# Patient Record
Sex: FEMALE | Race: WHITE | NOT HISPANIC OR LATINO | Employment: FULL TIME | URBAN - METROPOLITAN AREA
[De-identification: names, ages, dates, MRNs, and addresses within clinical notes are randomized per-mention and may not be internally consistent; named-entity substitution may affect disease eponyms.]

---

## 2024-01-19 NOTE — PROGRESS NOTES
"Subjective   Patient ID: Zohra Hines is a 78 y.o. female who presents for Medicare Annual Wellness Visit Subsequent. I have reviewed her past social, surgical, family and medical history.       HPI   The patient states that she is taking Sertraline daily as well as Alprazolam as needed for anxiety and depression and mirtazapine for her insomnia. Her past surgeries include, eye surgery, ankle repair surgery and a rip hip repair. She has a family history of breast cancer, CAD and emphysema. She complains of stiffness in her left ankle but denies having any pain. Her BP is well- controlled without any medications at this time.    Review of Systems  Constitutional: No fever or chills, No Night Sweats  Eyes: No Blurry Vision or Eye sight problems  ENT: No Nasal Discharge, Hoarseness, sore throat  Cardiovascular: no chest pain, no palpitations and no syncope.   Respiratory: no cough, no shortness of breath during exertion and no shortness of breath at rest.   Gastrointestinal: no abdominal pain, no nausea and no vomiting.   : No vaginal discharge, burning with urination, no blood in urine or stools  Skin: No Skin rashes or Lesions  Neuro: No Headache, no dizziness or Numbness or tingling  Psych: No Anxiety, depression or sleeping problems  Heme: No Easy bleeding or brusing.     Objective   /68   Pulse 65   Ht 1.549 m (5' 1\")   Wt 58.5 kg (129 lb)   SpO2 99%   BMI 24.37 kg/m²     Physical Exam  Patient declined Chaperone  Constitutional: Alert and in no acute distress. Well developed, well nourished.   Head and Face: Head and face: Normal.    Eyes: Normal external exam. Pupils were equal in size, round, reactive to light (PERRL) with normal accommodation and extraocular movements intact (EOMI).   Ears, Nose, Mouth, and Throat: External inspection of ears and nose: Normal.  Hearing: Normal.  Nasal mucosa, septum, and turbinates: Normal.  Lips, teeth, and gums: Normal.  Oropharynx: Normal.   Neck: No neck " mass was observed. Supple. Thyroid not enlarged and there were no palpable thyroid nodules.   Cardiovascular: Heart rate and rhythm were normal, normal S1 and S2. Pedal pulses: Normal. No peripheral edema.   Pulmonary: No respiratory distress. Clear bilateral breath sounds.   Breast: Normal Appearance, No Masses or lumps palpated  Abdomen: Soft nontender; no abdominal mass palpated. Normal bowel sounds. No organomegaly.   Musculoskeletal: No joint swelling seen, normal movements of all extremities. Range of motion: Normal.  Muscle strength/tone: Normal.    Skin: Normal skin color and pigmentation, normal skin turgor, and no rash.   Neurologic: Deep tendon reflexes were 2+ and symmetric.   Psychiatric: Judgment and insight: Intact. Mood and affect: Normal.  Lymphatic: No cervical lymphadenopathy. Palpation of lymph nodes in axillae: Normal.  Palpation of lymph nodes in groin: Normal.    Lab Results   Component Value Date    WBC 5.8 07/11/2018    HGB 11.9 (L) 07/11/2018    HCT 35.5 (L) 07/11/2018     (L) 07/11/2018    ALT 16 07/08/2018    AST 17 07/08/2018     07/11/2018    K 4.2 07/11/2018     07/11/2018    CREATININE 0.92 07/11/2018    BUN 29 (H) 07/11/2018    CO2 24 07/11/2018    INR 1.0 07/08/2018       Electrocardiogram 12 Lead  Normal sinus rhythm  ST & T wave abnormality, consider anterior ischemia  Abnormal ECG  No previous ECGs available  Confirmed by MATA BANEGAS JR, MD ([id number]) on 2/13/2014 3:17:23 PM  Electrocardiogram 12 Lead  Normal sinus rhythm  Nonspecific ST and T wave abnormality  Abnormal ECG  When compared with ECG of 02-FEB-2014 22:39,  T wave inversion no longer evident in Anterior leads  Confirmed by MIK CAMEJO MD ([id number]) on 7/9/2018 6:06:48 PM      Assessment/Plan   Diagnoses and all orders for this visit:  Medicare annual wellness visit, subsequent  Need for vaccination  -     Pneumococcal conjugate vaccine 20-valent IM  Asymptomatic menopausal state  -     XR  DEXA bone density; Future  Encounter for screening mammogram for breast cancer  -     BI mammo bilateral screening tomosynthesis; Future  Need for hepatitis C screening test  -     Hepatitis C antibody; Future  Screening for colorectal cancer  -     Cologuard® colon cancer screening; Future  Other vitamin B12 deficiency anemia  -     Vitamin B12; Future  Essential hypertension  -     CBC; Future  -     Comprehensive Metabolic Panel; Future  Elevated low density lipoprotein (LDL) cholesterol level  -     Lipid Panel; Future  -     Lipoprotein a; Future  Vitamin D deficiency  -     Vitamin D 25-Hydroxy,Total (for eval of Vitamin D levels); Future  Elevated blood sugar  -     Hemoglobin A1C; Future  Abnormal thyroid exam  -     TSH with reflex to Free T4 if abnormal; Future  Chronic pain of left ankle  -     Referral to Physical Therapy; Future        Dear Zohra Hines     It was my pleasure to take care of you today in the office. Below are the things we discussed today:    1. 1. Immunizations: Yearly Flu shot is recommended. Up-to-date          a: COVID: Please get booster from the pharmacy          b: Tetanus: Please get from the pharmacy          c: Shingrix: Up-to-date         d: Pneumovax: Up-to-date         e: Prevnar: Given today     2. Blood Work: Ordered   3. Seen your dentist twice a year  4. Yearly Eye exam is recommended    5. BMI: Normal  6: Diet recommendations:   Eat Clean, Try to have as many home cooked meals as possible  Avoid processed foods which contain excess calories, sugar, and sodium.    7. Exercise recommendations:   150 minutes a week to maintain your weight     If you have to loose weight, you need a better diet and exercise plan.     8. Supplements recommended:  a - Calcium 600 mg up to twice a day to get a total of 1200 mg. Each 8 oz of milk or yogurt or 1 oz of cheese, 1 Banana, 1 serving of green Leafy vegetable has about 300 mg of Calcium, so you may subtract that amount. Calcium  citrate is the only acceptable supplement to take if you take an acid suppressing medication like Prilosec; otherwise Calcium carbonate is acceptable too (It can cause Constipation).   b - Vitamin D - 2000 IU daily     9. Please get your Living will / Advance directive completed if you do not have one already. Please make sure our office has a copy of the latest one.     10. Colonoscopy: Cologuard ordered   11. Mammogram: Ordered   12. PAP: Not indicated   13. DEXA: Ordered   14: Skin Check: Please see Dermatology once a year for a Skin Check.     15. Insomnia: Continue mirtazapine.     16. Depression and anxiety: Continue sertraline and use Xanax as needed.    17. Left ankle stiffness: Physical therapy referral. Recommended that she use an ankle brace when walking.    18. Hyperlipidemia: Lipoprotein A ordered.    Follow up in one year for a Physical. Please call the office before your Physical to see if you need blood work completed prior to your physical.     Please call me if any questions arise from now until your next visit. I will call you after I am done seeing patients. A Doctor is always available by phone when the office is closed. Please feel free to call for help with any problem that you feel shouldn't wait until the office re-opens.     Scribe Attestation  By signing my name below, ISapna Scribe   attest that this documentation has been prepared under the direction and in the presence of Tiarra Frye MD.

## 2024-01-20 ENCOUNTER — OFFICE VISIT (OUTPATIENT)
Dept: PRIMARY CARE | Facility: CLINIC | Age: 79
End: 2024-01-20
Payer: MEDICARE

## 2024-01-20 VITALS
BODY MASS INDEX: 24.35 KG/M2 | DIASTOLIC BLOOD PRESSURE: 68 MMHG | SYSTOLIC BLOOD PRESSURE: 112 MMHG | OXYGEN SATURATION: 99 % | HEIGHT: 61 IN | HEART RATE: 65 BPM | WEIGHT: 129 LBS

## 2024-01-20 DIAGNOSIS — R94.6 ABNORMAL THYROID EXAM: ICD-10-CM

## 2024-01-20 DIAGNOSIS — Z78.0 ASYMPTOMATIC MENOPAUSAL STATE: ICD-10-CM

## 2024-01-20 DIAGNOSIS — Z11.59 NEED FOR HEPATITIS C SCREENING TEST: ICD-10-CM

## 2024-01-20 DIAGNOSIS — R73.9 ELEVATED BLOOD SUGAR: ICD-10-CM

## 2024-01-20 DIAGNOSIS — Z12.12 SCREENING FOR COLORECTAL CANCER: ICD-10-CM

## 2024-01-20 DIAGNOSIS — I10 ESSENTIAL HYPERTENSION: ICD-10-CM

## 2024-01-20 DIAGNOSIS — G89.29 CHRONIC PAIN OF LEFT ANKLE: ICD-10-CM

## 2024-01-20 DIAGNOSIS — D51.8 OTHER VITAMIN B12 DEFICIENCY ANEMIA: ICD-10-CM

## 2024-01-20 DIAGNOSIS — E78.00 ELEVATED LOW DENSITY LIPOPROTEIN (LDL) CHOLESTEROL LEVEL: ICD-10-CM

## 2024-01-20 DIAGNOSIS — M25.572 CHRONIC PAIN OF LEFT ANKLE: ICD-10-CM

## 2024-01-20 DIAGNOSIS — Z12.31 ENCOUNTER FOR SCREENING MAMMOGRAM FOR BREAST CANCER: ICD-10-CM

## 2024-01-20 DIAGNOSIS — Z23 NEED FOR VACCINATION: ICD-10-CM

## 2024-01-20 DIAGNOSIS — Z00.00 MEDICARE ANNUAL WELLNESS VISIT, SUBSEQUENT: Primary | ICD-10-CM

## 2024-01-20 DIAGNOSIS — Z12.11 SCREENING FOR COLORECTAL CANCER: ICD-10-CM

## 2024-01-20 DIAGNOSIS — E55.9 VITAMIN D DEFICIENCY: ICD-10-CM

## 2024-01-20 PROBLEM — M81.0 AGE RELATED OSTEOPOROSIS: Status: ACTIVE | Noted: 2024-01-20

## 2024-01-20 PROBLEM — F41.9 ANXIETY: Status: ACTIVE | Noted: 2024-01-20

## 2024-01-20 PROBLEM — A04.72 CLOSTRIDIUM DIFFICILE COLITIS: Status: ACTIVE | Noted: 2024-01-20

## 2024-01-20 PROBLEM — F32.A DEPRESSION: Status: ACTIVE | Noted: 2024-01-20

## 2024-01-20 PROBLEM — B00.1 RECURRENT COLD SORES: Status: ACTIVE | Noted: 2024-01-20

## 2024-01-20 PROBLEM — D51.9 ANEMIA, B12 DEFICIENCY: Status: ACTIVE | Noted: 2024-01-20

## 2024-01-20 PROCEDURE — 1159F MED LIST DOCD IN RCRD: CPT | Performed by: FAMILY MEDICINE

## 2024-01-20 PROCEDURE — 90677 PCV20 VACCINE IM: CPT | Performed by: FAMILY MEDICINE

## 2024-01-20 PROCEDURE — 3074F SYST BP LT 130 MM HG: CPT | Performed by: FAMILY MEDICINE

## 2024-01-20 PROCEDURE — 1123F ACP DISCUSS/DSCN MKR DOCD: CPT | Performed by: FAMILY MEDICINE

## 2024-01-20 PROCEDURE — G0439 PPPS, SUBSEQ VISIT: HCPCS | Performed by: FAMILY MEDICINE

## 2024-01-20 PROCEDURE — 1036F TOBACCO NON-USER: CPT | Performed by: FAMILY MEDICINE

## 2024-01-20 PROCEDURE — G0009 ADMIN PNEUMOCOCCAL VACCINE: HCPCS | Performed by: FAMILY MEDICINE

## 2024-01-20 PROCEDURE — 99397 PER PM REEVAL EST PAT 65+ YR: CPT | Performed by: FAMILY MEDICINE

## 2024-01-20 PROCEDURE — 99213 OFFICE O/P EST LOW 20 MIN: CPT | Performed by: FAMILY MEDICINE

## 2024-01-20 PROCEDURE — 3078F DIAST BP <80 MM HG: CPT | Performed by: FAMILY MEDICINE

## 2024-01-20 PROCEDURE — 1170F FXNL STATUS ASSESSED: CPT | Performed by: FAMILY MEDICINE

## 2024-01-20 PROCEDURE — 1157F ADVNC CARE PLAN IN RCRD: CPT | Performed by: FAMILY MEDICINE

## 2024-01-20 RX ORDER — ACETAMINOPHEN 500 MG
TABLET ORAL
COMMUNITY

## 2024-01-20 RX ORDER — ALPRAZOLAM 0.5 MG/1
0.5 TABLET ORAL NIGHTLY PRN
COMMUNITY
Start: 2016-04-18

## 2024-01-20 RX ORDER — DEXTROMETHORPHAN HYDROBROMIDE, GUAIFENESIN 5; 100 MG/5ML; MG/5ML
650 LIQUID ORAL EVERY 8 HOURS PRN
COMMUNITY

## 2024-01-20 RX ORDER — SERTRALINE HYDROCHLORIDE 100 MG/1
100 TABLET, FILM COATED ORAL DAILY
COMMUNITY
Start: 2015-06-18

## 2024-01-20 RX ORDER — MIRTAZAPINE 30 MG/1
30 TABLET, FILM COATED ORAL NIGHTLY
COMMUNITY
Start: 2023-12-15

## 2024-01-20 ASSESSMENT — LIFESTYLE VARIABLES
HOW OFTEN DURING THE LAST YEAR HAVE YOU FAILED TO DO WHAT WAS NORMALLY EXPECTED FROM YOU BECAUSE OF DRINKING: NEVER
HOW OFTEN DURING THE LAST YEAR HAVE YOU NEEDED AN ALCOHOLIC DRINK FIRST THING IN THE MORNING TO GET YOURSELF GOING AFTER A NIGHT OF HEAVY DRINKING: NEVER
AUDIT TOTAL SCORE: 4
HAS A RELATIVE, FRIEND, DOCTOR, OR ANOTHER HEALTH PROFESSIONAL EXPRESSED CONCERN ABOUT YOUR DRINKING OR SUGGESTED YOU CUT DOWN: NO
HOW OFTEN DURING THE LAST YEAR HAVE YOU BEEN UNABLE TO REMEMBER WHAT HAPPENED THE NIGHT BEFORE BECAUSE YOU HAD BEEN DRINKING: NEVER
HOW OFTEN DO YOU HAVE SIX OR MORE DRINKS ON ONE OCCASION: NEVER
HOW OFTEN DURING THE LAST YEAR HAVE YOU FOUND THAT YOU WERE NOT ABLE TO STOP DRINKING ONCE YOU HAD STARTED: NEVER
HOW MANY STANDARD DRINKS CONTAINING ALCOHOL DO YOU HAVE ON A TYPICAL DAY: 1 OR 2
SKIP TO QUESTIONS 9-10: 1
AUDIT-C TOTAL SCORE: 4
HOW OFTEN DURING THE LAST YEAR HAVE YOU HAD A FEELING OF GUILT OR REMORSE AFTER DRINKING: NEVER
HAVE YOU OR SOMEONE ELSE BEEN INJURED AS A RESULT OF YOUR DRINKING: NO
HOW OFTEN DO YOU HAVE A DRINK CONTAINING ALCOHOL: 4 OR MORE TIMES A WEEK

## 2024-01-20 ASSESSMENT — ACTIVITIES OF DAILY LIVING (ADL)
DRESSING: INDEPENDENT
BATHING: INDEPENDENT
GROCERY_SHOPPING: INDEPENDENT
MANAGING_FINANCES: INDEPENDENT
DOING_HOUSEWORK: INDEPENDENT
TAKING_MEDICATION: INDEPENDENT

## 2024-01-20 ASSESSMENT — COLUMBIA-SUICIDE SEVERITY RATING SCALE - C-SSRS
1. IN THE PAST MONTH, HAVE YOU WISHED YOU WERE DEAD OR WISHED YOU COULD GO TO SLEEP AND NOT WAKE UP?: NO
2. HAVE YOU ACTUALLY HAD ANY THOUGHTS OF KILLING YOURSELF?: NO

## 2024-01-20 ASSESSMENT — ENCOUNTER SYMPTOMS
DEPRESSION: 1
LOSS OF SENSATION IN FEET: 0
OCCASIONAL FEELINGS OF UNSTEADINESS: 1

## 2024-01-20 ASSESSMENT — PATIENT HEALTH QUESTIONNAIRE - PHQ9
SUM OF ALL RESPONSES TO PHQ9 QUESTIONS 1 AND 2: 0
1. LITTLE INTEREST OR PLEASURE IN DOING THINGS: NOT AT ALL
2. FEELING DOWN, DEPRESSED OR HOPELESS: NOT AT ALL

## 2024-01-20 NOTE — PATIENT INSTRUCTIONS

## 2024-01-24 ENCOUNTER — APPOINTMENT (OUTPATIENT)
Dept: PRIMARY CARE | Facility: CLINIC | Age: 79
End: 2024-01-24
Payer: MEDICARE

## 2024-01-24 LAB
25(OH)D3 SERPL-MCNC: 62 NG/ML (ref 30–100)
ALBUMIN SERPL BCP-MCNC: 4.2 G/DL (ref 3.4–5)
ALP SERPL-CCNC: 59 U/L (ref 33–136)
ALT SERPL W P-5'-P-CCNC: 9 U/L (ref 7–45)
ANION GAP SERPL CALC-SCNC: 15 MMOL/L (ref 10–20)
AST SERPL W P-5'-P-CCNC: 11 U/L (ref 9–39)
BILIRUB SERPL-MCNC: 0.5 MG/DL (ref 0–1.2)
BUN SERPL-MCNC: 40 MG/DL (ref 6–23)
CALCIUM SERPL-MCNC: 9.8 MG/DL (ref 8.6–10.6)
CHLORIDE SERPL-SCNC: 107 MMOL/L (ref 98–107)
CHOLEST SERPL-MCNC: 151 MG/DL (ref 0–199)
CHOLESTEROL/HDL RATIO: 2.6
CO2 SERPL-SCNC: 28 MMOL/L (ref 21–32)
CREAT SERPL-MCNC: 1.47 MG/DL (ref 0.5–1.05)
EGFRCR SERPLBLD CKD-EPI 2021: 36 ML/MIN/1.73M*2
ERYTHROCYTE [DISTWIDTH] IN BLOOD BY AUTOMATED COUNT: 12.3 % (ref 11.5–14.5)
EST. AVERAGE GLUCOSE BLD GHB EST-MCNC: 59 MG/DL
GLUCOSE SERPL-MCNC: 92 MG/DL (ref 74–99)
HBA1C MFR BLD: 3.7 %
HCT VFR BLD AUTO: 31.7 % (ref 36–46)
HCV AB SER QL: NONREACTIVE
HDLC SERPL-MCNC: 58.4 MG/DL
HGB BLD-MCNC: 10.6 G/DL (ref 12–16)
LDLC SERPL CALC-MCNC: 78 MG/DL
MCH RBC QN AUTO: 47.1 PG (ref 26–34)
MCHC RBC AUTO-ENTMCNC: 33.4 G/DL (ref 32–36)
MCV RBC AUTO: 141 FL (ref 80–100)
NON HDL CHOLESTEROL: 93 MG/DL (ref 0–149)
NRBC BLD-RTO: 0 /100 WBCS (ref 0–0)
PLATELET # BLD AUTO: 213 X10*3/UL (ref 150–450)
POTASSIUM SERPL-SCNC: 4.8 MMOL/L (ref 3.5–5.3)
PROT SERPL-MCNC: 6.1 G/DL (ref 6.4–8.2)
RBC # BLD AUTO: 2.25 X10*6/UL (ref 4–5.2)
SODIUM SERPL-SCNC: 145 MMOL/L (ref 136–145)
TRIGL SERPL-MCNC: 75 MG/DL (ref 0–149)
TSH SERPL-ACNC: 3.04 MIU/L (ref 0.44–3.98)
VIT B12 SERPL-MCNC: 91 PG/ML (ref 211–911)
VLDL: 15 MG/DL (ref 0–40)
WBC # BLD AUTO: 4.4 X10*3/UL (ref 4.4–11.3)

## 2024-01-24 PROCEDURE — 82607 VITAMIN B-12: CPT

## 2024-01-24 PROCEDURE — 84443 ASSAY THYROID STIM HORMONE: CPT

## 2024-01-24 PROCEDURE — 80053 COMPREHEN METABOLIC PANEL: CPT

## 2024-01-24 PROCEDURE — 82306 VITAMIN D 25 HYDROXY: CPT

## 2024-01-24 PROCEDURE — 83036 HEMOGLOBIN GLYCOSYLATED A1C: CPT

## 2024-01-24 PROCEDURE — 86803 HEPATITIS C AB TEST: CPT

## 2024-01-24 PROCEDURE — 80061 LIPID PANEL: CPT

## 2024-01-24 PROCEDURE — 36415 COLL VENOUS BLD VENIPUNCTURE: CPT

## 2024-01-24 PROCEDURE — 85027 COMPLETE CBC AUTOMATED: CPT

## 2024-01-24 PROCEDURE — 82172 ASSAY OF APOLIPOPROTEIN: CPT

## 2024-01-26 DIAGNOSIS — E53.8 B12 DEFICIENCY: Primary | ICD-10-CM

## 2024-01-26 DIAGNOSIS — R79.89 OTHER SPECIFIED ABNORMAL FINDINGS OF BLOOD CHEMISTRY: ICD-10-CM

## 2024-01-26 LAB — LPA SERPL-MCNC: 60 MG/DL

## 2024-01-26 RX ORDER — CYANOCOBALAMIN 1000 UG/ML
1000 INJECTION, SOLUTION INTRAMUSCULAR; SUBCUTANEOUS
Status: SHIPPED | OUTPATIENT
Start: 2024-01-27 | End: 2025-01-21

## 2024-02-08 ENCOUNTER — CLINICAL SUPPORT (OUTPATIENT)
Dept: PRIMARY CARE | Facility: CLINIC | Age: 79
End: 2024-02-08
Payer: MEDICARE

## 2024-02-08 DIAGNOSIS — D51.8 OTHER VITAMIN B12 DEFICIENCY ANEMIA: ICD-10-CM

## 2024-02-08 PROCEDURE — 96372 THER/PROPH/DIAG INJ SC/IM: CPT | Performed by: FAMILY MEDICINE

## 2024-02-08 RX ORDER — CYANOCOBALAMIN 1000 UG/ML
1000 INJECTION, SOLUTION INTRAMUSCULAR; SUBCUTANEOUS ONCE
Status: COMPLETED | OUTPATIENT
Start: 2024-02-08 | End: 2024-02-08

## 2024-02-08 RX ADMIN — CYANOCOBALAMIN 1000 MCG: 1000 INJECTION, SOLUTION INTRAMUSCULAR; SUBCUTANEOUS at 14:09

## 2024-02-16 ENCOUNTER — HOSPITAL ENCOUNTER (OUTPATIENT)
Dept: RADIOLOGY | Facility: CLINIC | Age: 79
Discharge: HOME | End: 2024-02-16
Payer: MEDICARE

## 2024-02-16 DIAGNOSIS — Z12.31 ENCOUNTER FOR SCREENING MAMMOGRAM FOR BREAST CANCER: ICD-10-CM

## 2024-02-16 PROCEDURE — 77067 SCR MAMMO BI INCL CAD: CPT

## 2024-02-16 PROCEDURE — 77067 SCR MAMMO BI INCL CAD: CPT | Performed by: RADIOLOGY

## 2024-02-16 PROCEDURE — 77063 BREAST TOMOSYNTHESIS BI: CPT | Performed by: RADIOLOGY

## 2024-02-21 LAB — NONINV COLON CA DNA+OCC BLD SCRN STL QL: NEGATIVE

## 2024-03-01 ENCOUNTER — APPOINTMENT (OUTPATIENT)
Dept: PRIMARY CARE | Facility: CLINIC | Age: 79
End: 2024-03-01
Payer: MEDICARE

## 2024-03-08 ENCOUNTER — OFFICE VISIT (OUTPATIENT)
Dept: PRIMARY CARE | Facility: CLINIC | Age: 79
End: 2024-03-08
Payer: MEDICARE

## 2024-03-08 VITALS
HEART RATE: 68 BPM | DIASTOLIC BLOOD PRESSURE: 62 MMHG | WEIGHT: 139 LBS | OXYGEN SATURATION: 95 % | BODY MASS INDEX: 26.24 KG/M2 | SYSTOLIC BLOOD PRESSURE: 138 MMHG | HEIGHT: 61 IN

## 2024-03-08 DIAGNOSIS — N18.31 STAGE 3A CHRONIC KIDNEY DISEASE (MULTI): ICD-10-CM

## 2024-03-08 DIAGNOSIS — I10 ESSENTIAL HYPERTENSION: Primary | ICD-10-CM

## 2024-03-08 DIAGNOSIS — D51.8 OTHER VITAMIN B12 DEFICIENCY ANEMIA: ICD-10-CM

## 2024-03-08 DIAGNOSIS — E78.00 ELEVATED LOW DENSITY LIPOPROTEIN (LDL) CHOLESTEROL LEVEL: ICD-10-CM

## 2024-03-08 DIAGNOSIS — F33.42 RECURRENT MAJOR DEPRESSIVE DISORDER, IN FULL REMISSION (CMS-HCC): ICD-10-CM

## 2024-03-08 PROCEDURE — 3075F SYST BP GE 130 - 139MM HG: CPT | Performed by: FAMILY MEDICINE

## 2024-03-08 PROCEDURE — 1159F MED LIST DOCD IN RCRD: CPT | Performed by: FAMILY MEDICINE

## 2024-03-08 PROCEDURE — 1160F RVW MEDS BY RX/DR IN RCRD: CPT | Performed by: FAMILY MEDICINE

## 2024-03-08 PROCEDURE — 1036F TOBACCO NON-USER: CPT | Performed by: FAMILY MEDICINE

## 2024-03-08 PROCEDURE — 99213 OFFICE O/P EST LOW 20 MIN: CPT | Performed by: FAMILY MEDICINE

## 2024-03-08 PROCEDURE — 1123F ACP DISCUSS/DSCN MKR DOCD: CPT | Performed by: FAMILY MEDICINE

## 2024-03-08 PROCEDURE — 3078F DIAST BP <80 MM HG: CPT | Performed by: FAMILY MEDICINE

## 2024-03-08 PROCEDURE — 1157F ADVNC CARE PLAN IN RCRD: CPT | Performed by: FAMILY MEDICINE

## 2024-03-08 NOTE — PROGRESS NOTES
"Subjective   Patient ID: Zohra Hines is a 78 y.o. female who presents for Follow-up.    HPI   The patient reports that she is taking mirtazapine for her insomnia and Sertraline for her anxiety and depression and well as Xanax as needed. Her lipoprotein A is elevated.    Review of Systems  Constitutional: No fever or chills  Cardiovascular: no chest pain, no palpitations and no syncope.   Respiratory: no cough, no shortness of breath during exertion and no shortness of breath at rest.   Gastrointestinal: no abdominal pain, no nausea and no vomiting.  Neuro: No Headache, no dizziness    Objective   /62   Pulse 68   Ht 1.549 m (5' 1\")   Wt 63 kg (139 lb)   SpO2 95%   BMI 26.26 kg/m²     Physical Exam  Constitutional: Alert and in no acute distress. Well developed, well nourished  Head and Face: Head and face: Normal.    Cardiovascular: Heart rate and rhythm were normal, normal S1 and S2. No peripheral edema.   Pulmonary: No respiratory distress. Clear bilateral breath sounds.  Musculoskeletal: Gait and station: Normal. Muscle strength/tone: Normal.   Skin: Normal skin color and pigmentation, normal skin turgor, and no rash.    Psychiatric: Judgment and insight: Intact. Mood and affect: Normal.    Lab Results   Component Value Date    WBC 4.4 01/24/2024    HGB 10.6 (L) 01/24/2024    HCT 31.7 (L) 01/24/2024     01/24/2024    CHOL 151 01/24/2024    TRIG 75 01/24/2024    HDL 58.4 01/24/2024    ALT 9 01/24/2024    AST 11 01/24/2024     01/24/2024    K 4.8 01/24/2024     01/24/2024    CREATININE 1.47 (H) 01/24/2024    BUN 40 (H) 01/24/2024    CO2 28 01/24/2024    TSH 3.04 01/24/2024    INR 1.0 07/08/2018    HGBA1C 3.7 01/24/2024       BI mammo bilateral screening tomosynthesis  Narrative: Interpreted By:  Yesica Finley,   STUDY:  BI MAMMO BILATERAL SCREENING TOMOSYNTHESIS;  2/16/2024 12:50 pm      ACCESSION NUMBER(S):  [id number]      ORDERING CLINICIAN:  NATO SIFUENTES    "   INDICATION:  Screening.      COMPARISON:  10/13/2016 02/16/2012      FINDINGS:  2D and tomosynthesis images were reviewed at 1 mm slice thickness.      Density:  The breast tissue is heterogeneously dense, which may  obscure small masses.      No suspicious masses or calcifications are identified.      Impression: No mammographic evidence of malignancy.          BI-RADS CATEGORY:  BI-RADS Category:  1 Negative.  Recommendation:  Routine Screening Mammogram in 1 Year.  Recommended Date:  1 Year.  Laterality:  Bilateral.      For any future breast imaging appointments, please call [phone]).          MACRO:  None      Signed by: Yesica Finley 2/16/2024 4:24 PM  Dictation workstation:   [id number]      Assessment/Plan   Diagnoses and all orders for this visit:  Essential hypertension  Elevated low density lipoprotein (LDL) cholesterol level  -     CT cardiac scoring wo IV contrast; Future  Other vitamin B12 deficiency anemia  Recurrent major depressive disorder, in full remission (CMS/HCC)  Stage 3a chronic kidney disease (CMS/HCC)        Dear Zohra Hines     It was my pleasure to take care of you today in the office. Below are the things we discussed today:    1. Insomnia: Continue mirtazapine.     2. Depression and anxiety: Continue sertraline and use Xanax as needed.    3. Vitamin B-12 deficiency: Vitamin B-12 injection given today.     4. Hyperlipidemia: Elevated lipoprotein A. CT cardiac score ordered.    5. Blood work: Ordered. The patient will get it done at the end of the month.     Your yearly Physical is due in: Jan 2024  When you call the office for your yearly Physical, please ask them to inform me to order your blood work, so that you can get the fasting blood work before your appointment and we can discuss the results at your physical.      Please call me if any questions arise from now until your next visit. I will call you after I am done seeing patients. A Doctor is always  available by phone when the office is closed. Please feel free to call for help with any problem that you feel shouldn't wait until the office re-opens.     Scribe Attestation  By signing my name below, I, Sapna Bhakta, Scribmk   attest that this documentation has been prepared under the direction and in the presence of Tiarra Frye MD.

## 2024-04-10 ENCOUNTER — APPOINTMENT (OUTPATIENT)
Dept: PRIMARY CARE | Facility: CLINIC | Age: 79
End: 2024-04-10
Payer: MEDICARE

## 2024-04-12 ENCOUNTER — APPOINTMENT (OUTPATIENT)
Dept: PRIMARY CARE | Facility: CLINIC | Age: 79
End: 2024-04-12
Payer: MEDICARE

## 2024-04-12 ENCOUNTER — HOSPITAL ENCOUNTER (OUTPATIENT)
Dept: RADIOLOGY | Facility: CLINIC | Age: 79
Discharge: HOME | End: 2024-04-12
Payer: MEDICARE

## 2024-04-12 DIAGNOSIS — Z78.0 ASYMPTOMATIC MENOPAUSAL STATE: ICD-10-CM

## 2024-04-12 PROCEDURE — 77080 DXA BONE DENSITY AXIAL: CPT

## 2024-04-12 PROCEDURE — 77080 DXA BONE DENSITY AXIAL: CPT | Performed by: RADIOLOGY

## 2024-05-17 ENCOUNTER — APPOINTMENT (OUTPATIENT)
Dept: PRIMARY CARE | Facility: CLINIC | Age: 79
End: 2024-05-17
Payer: MEDICARE

## 2024-05-21 ENCOUNTER — OFFICE VISIT (OUTPATIENT)
Dept: RHEUMATOLOGY | Facility: CLINIC | Age: 79
End: 2024-05-21
Payer: MEDICARE

## 2024-05-21 VITALS — DIASTOLIC BLOOD PRESSURE: 67 MMHG | SYSTOLIC BLOOD PRESSURE: 159 MMHG | RESPIRATION RATE: 16 BRPM | HEART RATE: 57 BPM

## 2024-05-21 DIAGNOSIS — M19.172 POST-TRAUMATIC OSTEOARTHRITIS OF LEFT ANKLE: Primary | ICD-10-CM

## 2024-05-21 DIAGNOSIS — M80.00XA AGE-RELATED OSTEOPOROSIS WITH CURRENT PATHOLOGICAL FRACTURE, INITIAL ENCOUNTER: ICD-10-CM

## 2024-05-21 DIAGNOSIS — R79.89 ELEVATED SERUM CREATININE: ICD-10-CM

## 2024-05-21 PROCEDURE — 1123F ACP DISCUSS/DSCN MKR DOCD: CPT | Performed by: STUDENT IN AN ORGANIZED HEALTH CARE EDUCATION/TRAINING PROGRAM

## 2024-05-21 PROCEDURE — 99204 OFFICE O/P NEW MOD 45 MIN: CPT | Performed by: STUDENT IN AN ORGANIZED HEALTH CARE EDUCATION/TRAINING PROGRAM

## 2024-05-21 PROCEDURE — 1160F RVW MEDS BY RX/DR IN RCRD: CPT | Performed by: STUDENT IN AN ORGANIZED HEALTH CARE EDUCATION/TRAINING PROGRAM

## 2024-05-21 PROCEDURE — 3077F SYST BP >= 140 MM HG: CPT | Performed by: STUDENT IN AN ORGANIZED HEALTH CARE EDUCATION/TRAINING PROGRAM

## 2024-05-21 PROCEDURE — 3078F DIAST BP <80 MM HG: CPT | Performed by: STUDENT IN AN ORGANIZED HEALTH CARE EDUCATION/TRAINING PROGRAM

## 2024-05-21 PROCEDURE — 1157F ADVNC CARE PLAN IN RCRD: CPT | Performed by: STUDENT IN AN ORGANIZED HEALTH CARE EDUCATION/TRAINING PROGRAM

## 2024-05-21 PROCEDURE — 1159F MED LIST DOCD IN RCRD: CPT | Performed by: STUDENT IN AN ORGANIZED HEALTH CARE EDUCATION/TRAINING PROGRAM

## 2024-05-21 RX ORDER — TERIPARATIDE 250 UG/ML
20 INJECTION, SOLUTION SUBCUTANEOUS DAILY
Qty: 7.2 ML | Refills: 3 | Status: SHIPPED | OUTPATIENT
Start: 2024-05-21 | End: 2024-06-03 | Stop reason: CLARIF

## 2024-05-21 NOTE — PROGRESS NOTES
"Subjective   Patient ID: Zohra Hines is a 78 y.o. female who presents for No chief complaint on file..  HPI:    Stiffness in L ankle, imbalance,   Personal or family history of fragility fracture?   Fractured her L ankle after a slip around 2016, in 2018, fractured right hip, had to get a repair                                                            Hx of hypothyroidism/hyperthyroidism/hyperparathyroidism/hypoparathyroidism? no  Hx of GI , renal , liver, collagen or malignancy issues?          CKD EGFR 36  Accelerated bone loss meds ?    anti-seizure/ depression meds, immunosuppressants , anti- aromatase inhibitors, Thyroid replacement therapy, anticoagulants?   steroids?     no                     Anti-resorptive meds? Bisphosphonates, estrogen, Calcitonin- was on Prolia in the past ; it was stopped after her past doctor, Dr Marcus retired, Was on Prolia from [phone] (approximately); it was stopped as patient had to go to infusion center ; distant history of fosamax for 10 years  dental hygiene? No issues  dental work planned? Had repair of an implant 4/2024  Dairy (Y)  Ca/VitD (Y) ETOH (Y-one glass of wine daily) Tobacco (history of distant smoking)    Menopause? Age 53 approximately  Heart attack or stroke? no    Secondary labs:   2024: Cr H at 1.47, EGFR 36, TSH normal, Vit D normal  Objective   There were no vitals taken for this visit.      Physical Exam  Constitutional: Alert and in no acute distress. Well developed, well nourished  L ankle with chronic discoloration   Lab Results   Component Value Date    WBC 4.4 01/24/2024    HGB 10.6 (L) 01/24/2024    HCT 31.7 (L) 01/24/2024     01/24/2024    ALT 9 01/24/2024    AST 11 01/24/2024    CREATININE 1.47 (H) 01/24/2024          No results found for: \"ANANP\", \"ANATITERADD\", \"ANACO\", \"ANAPATTRN\", \"ANPA2\", \"FANAP\", \"ANATITER\", \"ANAT2\", \"BRANDEN\", \"CDCANA\", \"DSDN\", \"EMPSMRNP\", \"SCLN\", \"SCLABQ\", \"SCIB\", \"CTIB\", \"RRVKXW76\", \"RTWRMM85\", \"ASSB\", \"SSBB\", " "\"C3\", \"C4\", \"UAMICCOMM\", \"UTPCR\", \"ANTIRIBO\", \"ACEN\", \"SEDRATE\", \"NONUHFIRE\", \"POCESR\", \"CRP\", \"RF\", \"CCPIGGQUAL\"\\            There is currently no information documented on the homunculus. Go to the Rheumatology activity and complete the homunculus joint exam.      XR DEXA bone density  Narrative: Interpreted By:  Yesica Finley,   STUDY:  DEXA BONE DENSITY4/12/2024 12:59 pm      INDICATION:  Signs/Symptoms:See Associated Diagnosis. The patient is a 79 y/o  year old F.      COMPARISON:  10/13/2016      ACCESSION NUMBER(S):  [id number]      ORDERING CLINICIAN:  NATO SIFUENTES      TECHNIQUE:  DEXA BONE DENSITY      FINDINGS:  SPINE L1-L4  Bone Mineral Density: 1.07  T-Score 0.2  Z-Score 2.8  Bone Mineral Density change vs baseline:  17.9  Bone Mineral Density change vs previous: -1.6      LEFT FEMUR -TOTAL  Bone Mineral Density: 0.726  T-Score -1.8   Z-Score  0.2  Bone Mineral Density change vs baseline: -13.9  Bone Mineral Density change vs previous: -5.4      LEFT FEMUR -NECK  Bone Mineral Density: 0.52  T-Score -3  Z-Score -0.7          World Health Organization (WHO) criteria for post-menopausal,   Women:  Normal:         T-score at or above -1 SD  Osteopenia:   T-score between -1 and -2.5 SD  Osteoporosis: T-score at or below -2.5 SD          10-year Fracture Risk:  Major Osteoporotic Fracture  Not reported  Hip Fracture                        Not reported      Note:  If no FRAX score is reported, it is because:  Some T-score for Spine Total or Hip Total or Femoral Neck at or  below-2.5/prior hip or vertebral fracture.      This exam was performed at [facility] on a [employer]  [place] [facility].          Impression: DEXA:  According to World Health Organization criteria,  classification is osteoporosis.      Followup recommended in two years or sooner as clinically warranted.      All images and detailed analysis are available on the  Radiology  PACS.      MACRO:  None      Signed by: " Yesica Finley 4/12/2024 4:07 PM  Dictation workstation:   [id number]      === 04/12/24 ===    DEXA BONE DENSITY    - Impression -  DEXA:  According to World Health Organization criteria,  classification is osteoporosis.    Followup recommended in two years or sooner as clinically warranted.    All images and detailed analysis are available on the  Radiology  PACS.    MACRO:  None    Signed by: Yesica Finley 4/12/2024 4:07 PM  Dictation workstation:   [id number]    Assessment/Plan:  #Osteoporosis  -Patient counseled on osteoporosis diagnosis. Patient counseled on necessary vitamin D and calcium supplementation.  Patient counseled on importance of stability and balance.  Weightbearing exercises, such as walking, encouraged.  Patient handout given.  -TSH normal, Vit D normal; will get rest of secondary labs as well as MBD labs given elevated Creatinine on past labs; should be fasting    -4/2024 DEXA at [facility]- personally reviewed and interpreted by myself 5/2024- should repeat 4/2026  FINDINGS:  SPINE L1-L4  Bone Mineral Density: 1.07  T-Score 0.2  Z-Score 2.8  Bone Mineral Density change vs baseline:  17.9  Bone Mineral Density change vs previous: -1.6      LEFT FEMUR -TOTAL  Bone Mineral Density: 0.726  T-Score -1.8   Z-Score  0.2  Bone Mineral Density change vs baseline: -13.9  Bone Mineral Density change vs previous: -5.4      LEFT FEMUR -NECK  Bone Mineral Density: 0.52  T-Score -3  Z-Score -0.7    -given history of R hip fracture and T score of -3 at left femur, I would recommend an anabolic; patient prefers forteo/tymlos to evenity  -Risks of teriparatide and abaloparatide discussed including dizziness, injection site reactions, increased calcium, nausea,and achy joints.  Black box warning of sarcoma discussed.  Patient handout given.  Patient understanding and aware of risks.  -1 year Forteo prescribed specialty pharmacy May 2024  -Will likely continue Forteo for 2 to 3 years; afterwards, consider  switch to Prolia lifelong versus a bisphosphonate with the understanding that patient was on alendronate for around 10 years prior to 2019    #postraumatic left ankle instability/OA  -Orthotics prescribed for arch support per patient request, discussed with PMD, PMD is also recommending patient go to physical therapy    #Elevated Creatinine  -Should repeat with next labs; with EGFR 36, Forteo is still  fine    Addendum 10/1/2024:  Patient is s/p  1 month of forteo, but cannot tolerate it d/t daily injections and pruritus at injection site; will switch to evenity, with likely lifelong prolia after  -Specialty pharmacy messaged October 2024 to order Evenity for 1 year at [facility]  -Risks of romosozumab discussed including but not limited to arthralgias, injection site reactions, rare risk of osteonecrosis of jaw or atypical fracture.  Blackbox risk warning discussed of MI, stroke, and cardiovascular death, and discussed it should not be initiated in patients who have had an MI or stroke within the previous year  Specialty pharmacy message to tell patient to get labs done within 30 days of her first Evenity  -Patient should stop Forteo    Patient counseled to seek medical care if any new or worsening symptoms, urgently if needed.      Note discussed with primary care doctor.    Return to clinic in 1 year,  sooner if needed    Dragon dictation software was used to dictate this note. Errors may have occurred during dictation that was not intended by the user.

## 2024-05-21 NOTE — PATIENT INSTRUCTIONS
Take 2000 units of Vitamin D3 daily    Take calcium citrate around 500-600 mg a day; or if you prefer, you can eat extra calcium in diet     Start forteo or tymlos when approved; let me know if cost prohibitive    For questions about the status of your medication, you can call the specialty pharmacy main line at [phone]     Routinely, get fasting bloodwork and urine    Well repeat the bone density 4/2026

## 2024-05-22 ENCOUNTER — SPECIALTY PHARMACY (OUTPATIENT)
Dept: PHARMACY | Facility: CLINIC | Age: 79
End: 2024-05-22

## 2024-06-03 DIAGNOSIS — M81.0 AGE RELATED OSTEOPOROSIS, UNSPECIFIED PATHOLOGICAL FRACTURE PRESENCE: Primary | ICD-10-CM

## 2024-06-03 RX ORDER — TERIPARATIDE 250 UG/ML
20 INJECTION, SOLUTION SUBCUTANEOUS DAILY
Qty: 7.44 ML | Refills: 3 | Status: SHIPPED | OUTPATIENT
Start: 2024-06-03

## 2024-06-03 NOTE — PROGRESS NOTES
Prescribed Forteo but insurance requires generic teriparatide, Dr. Mahin nelson with switch, updating rx. PA approved 1/1/24-5/22/26.

## 2024-06-04 PROCEDURE — RXMED WILLOW AMBULATORY MEDICATION CHARGE

## 2024-06-06 ENCOUNTER — SPECIALTY PHARMACY (OUTPATIENT)
Dept: PHARMACY | Facility: CLINIC | Age: 79
End: 2024-06-06

## 2024-06-07 ENCOUNTER — PHARMACY VISIT (OUTPATIENT)
Dept: PHARMACY | Facility: CLINIC | Age: 79
End: 2024-06-07
Payer: MEDICARE

## 2024-07-02 ENCOUNTER — CLINICAL SUPPORT (OUTPATIENT)
Dept: PRIMARY CARE | Facility: CLINIC | Age: 79
End: 2024-07-02
Payer: MEDICARE

## 2024-07-02 DIAGNOSIS — D51.8 OTHER VITAMIN B12 DEFICIENCY ANEMIA: ICD-10-CM

## 2024-07-02 PROCEDURE — 96372 THER/PROPH/DIAG INJ SC/IM: CPT | Performed by: FAMILY MEDICINE

## 2024-07-02 RX ORDER — CYANOCOBALAMIN 1000 UG/ML
1000 INJECTION, SOLUTION INTRAMUSCULAR; SUBCUTANEOUS ONCE
Status: COMPLETED | OUTPATIENT
Start: 2024-07-02 | End: 2024-07-02

## 2024-07-05 ENCOUNTER — SPECIALTY PHARMACY (OUTPATIENT)
Dept: PHARMACY | Facility: CLINIC | Age: 79
End: 2024-07-05

## 2024-07-15 ENCOUNTER — SPECIALTY PHARMACY (OUTPATIENT)
Dept: PHARMACY | Facility: CLINIC | Age: 79
End: 2024-07-15

## 2024-07-22 ENCOUNTER — SPECIALTY PHARMACY (OUTPATIENT)
Dept: PHARMACY | Facility: CLINIC | Age: 79
End: 2024-07-22

## 2024-07-25 ENCOUNTER — SPECIALTY PHARMACY (OUTPATIENT)
Dept: PHARMACY | Facility: CLINIC | Age: 79
End: 2024-07-25

## 2024-07-26 ENCOUNTER — TELEPHONE (OUTPATIENT)
Dept: RHEUMATOLOGY | Facility: CLINIC | Age: 79
End: 2024-07-26
Payer: MEDICARE

## 2024-07-26 NOTE — TELEPHONE ENCOUNTER
"Call placed to patient to make aware of pharmacy team being unsuccessful in establishing contact for shipment set up of prescribed Forteo. This nurse made patient aware of attempts made and messages left. Pt states she \"wasn't ready at the time, but is ready now.\" This nurse instructed that she would at this point have to call and schedule for delivery. Number to specialty pharmacy given. No other concerns to address at this time.   "

## 2024-08-06 ENCOUNTER — SPECIALTY PHARMACY (OUTPATIENT)
Dept: PHARMACY | Facility: HOSPITAL | Age: 79
End: 2024-08-06
Payer: MEDICARE

## 2024-08-09 ENCOUNTER — PHARMACY VISIT (OUTPATIENT)
Dept: PHARMACY | Facility: CLINIC | Age: 79
End: 2024-08-09
Payer: MEDICARE

## 2024-08-09 ENCOUNTER — TELEMEDICINE CLINICAL SUPPORT (OUTPATIENT)
Dept: PHARMACY | Facility: HOSPITAL | Age: 79
End: 2024-08-09
Payer: MEDICARE

## 2024-08-09 PROCEDURE — RXMED WILLOW AMBULATORY MEDICATION CHARGE

## 2024-08-09 NOTE — PROGRESS NOTES
[facility] Clinical Note    Zohra Hines is a 78 y.o. female, who is on the specialty pharmacy service for management of:  Osteoporosis Core.    Zohra Hines is taking: Forteo.    Medication Receipt Date: 8/13  Medication Start Date (planned or actual): 8/13 (restarting)    Zohra was contacted on 8/9/2024 at 2:13 PM for a virtual pharmacy visit with encounter number [id number] from:   [facility]  [address]  [facility] [address]  [place] [facility] [address]  Dept: [phone]  Dept Fax: [phone]  Loc: [phone]    Zohra was offered a Telemedicine Video visit or Telephone visit.  Zohra consented to a telephone visit, which was performed.    The most recent encounter visit with the referring prescriber Dr. Shagufta Stockton on 5/21/24 was reviewed.  Pharmacy will continue to collaborate in the care of this patient with the referring prescriber Dr. Shagufta Stockton.    General Assessment      Impression/Plan  IMPRESSION/PLAN:  Is patient high risk (potential patients:  pregnancy, geriatric, pediatric)? No   Is laboratory follow-up needed? No  Is a clinical intervention needed? No  Next reassessment date? Approx every 6 months  Additional comments: continuation of therapy, updated rx sent following telepharmacy visit    Refer to the encounter summary report for documentation details about patient counseling and education.      Medication Adherence    The importance of adherence was discussed with the patient and they were advised to take the medication as prescribed by their provider. Patient was encouraged to call their physician's office if they have a question regarding a missed dose.     QOL/Patient Satisfaction  Rate your quality of life on scale of 1-10: -- (N/A)  Rate your satisfaction with  Specialty Pharmacy on scale of 1-10: 10 - Completely satisfied      Patient was advised to contact the pharmacy if there are any changes  to their medication list, including prescriptions, OTC medications, herbal products, or supplements. Patient was advised of [facility]'s dispensing process, refill timeline, contact information ([phone]), and patient management follow up. Patient confirmed understanding of education conducted during assessment. All patient questions and concerns were addressed to the best of my ability. Patient was encouraged to contact the specialty pharmacy with any questions or concerns.    Confirmed follow-up outreaches are properly scheduled and reviewed goals of therapy with the patient.        MCKINLEY MERRILL, MauriceD

## 2024-08-20 ENCOUNTER — APPOINTMENT (OUTPATIENT)
Dept: PRIMARY CARE | Facility: CLINIC | Age: 79
End: 2024-08-20
Payer: MEDICARE

## 2024-08-20 PROCEDURE — 96372 THER/PROPH/DIAG INJ SC/IM: CPT | Performed by: FAMILY MEDICINE

## 2024-08-29 ENCOUNTER — EVALUATION (OUTPATIENT)
Dept: PHYSICAL THERAPY | Facility: CLINIC | Age: 79
End: 2024-08-29
Payer: MEDICARE

## 2024-08-29 DIAGNOSIS — R26.89 BALANCE PROBLEM: ICD-10-CM

## 2024-08-29 DIAGNOSIS — M25.572 PAIN IN LEFT ANKLE AND JOINTS OF LEFT FOOT: Primary | ICD-10-CM

## 2024-08-29 PROCEDURE — 97161 PT EVAL LOW COMPLEX 20 MIN: CPT | Mod: GP

## 2024-08-29 PROCEDURE — 97110 THERAPEUTIC EXERCISES: CPT | Mod: GP

## 2024-08-29 ASSESSMENT — ENCOUNTER SYMPTOMS
OCCASIONAL FEELINGS OF UNSTEADINESS: 1
LOSS OF SENSATION IN FEET: 0
DEPRESSION: 0

## 2024-08-29 NOTE — PROGRESS NOTES
Physical Therapy Evaluation    Patient Name: Zohra Hines  MRN: [id number]  Today's Date: 8/29/24  Time Calculation  Start Time: 1700  Stop Time: 1758  Time Calculation (min): 58 min  PT Evaluation Time Entry  PT Evaluation (Low) Time Entry: 20  PT Therapeutic Procedures Time Entry  Therapeutic Exercise Time Entry: 25        [facility]  Plan of Care: 8/29/24 to 11/27/24  Visit #1    Referring MD Tiarra Frye  Imaging Performed No recent x-rays    Assessment     Zohra Hines is a 78 y.o. referred for L ankle pain/stiffness and balance issues. Patient demonstrates decreased L ankle ROM and joint mobility, decreased L ankle and R hip strength, decreased static balance, altered gait mechanics, and pain. At this time, patient is limited with walking especially on uneven or soft surfaces, negotiating steps, getting up out of chairs, carrying things while walking, and is at increased risk of falls. Patient will benefit from physical therapy services to address stated impairments and improve functional mobility.    Plan  Treatment/Interventions: Cryotherapy, Education/ Instruction, Gait training, Hot pack, Manual therapy, Neuromuscular re-education, Self care/ home management, Therapeutic activities, Therapeutic exercises  PT Plan: Skilled PT  PT Frequency: 1 time per week  Duration: 10 weeks  Onset Date: 08/01/23  Certification Period Start Date: 08/29/24  Certification Period End Date: 11/27/24  Number of Treatments Authorized: MN  Rehab Potential: Fair  Plan of Care Agreement: Patient    Primary diagnosis: Pain in L ankle  Current Problem  Problem List Items Addressed This Visit    None  Visit Diagnoses         Codes    Pain in left ankle and joints of left foot    -  Primary M25.572    Relevant Orders    Follow Up In Physical Therapy    Balance problem     R26.89    Relevant Orders    Follow Up In Physical Therapy                General:  General  Reason for Referral: L ankle pain,  balance  Referred By: Tiarra Frye  Past Medical History Relevant to Rehab: L ankle fracture with 4 surgeries in 2016; R hip fracture and surgery from same fall  Preferred Learning Style: verbal, visual, written  Precautions:  Precautions  MARYELLENADI Fall Risk Score (The score of 4 or more indicates an increased risk of falling): 4  Vital Signs:       Subjective:  Chief complaints: balance/weakness  Onset/Surgery Date: Last year or so  Mechanism of Injury: Fall   Previous History: 2016 fell and broke L ankle, R hip   Personal Factors that may impact care: osteoporosis     Pain:  Current: 1/10 Best: 0/10 Worst: 5/10   Location: L ankle   Type: ache   Aggravators: pivoting, walking, walking outside, carrying things   Alleviators: orthotics, tylenol   Numbness/tingling? No    Function:   Work/Recreation:    Prior Level: Full   Current limitations: Walking, pivoting, carrying things while walking   Condition: Worsening     Home Situation: apartment   Stairs: yes   Lives alone   No concerns about home set up    Any falls in the past year: 3-4 times, would lose her balance     Injuries? No    Fear of falling? Yes    Sleep:    Getting to sleep No   Disturbed No     Goals for Therapy:    Walk better without worrying about falling    Objective   Gait:  Posture:  Palpation:  Reflexes:  Coordination:  Sensation:  Edema:    ROM/Flexibility:    Ankle DF R / L : 0 / 0                Ankle PF R / L :      60 / 40             Ankle INV R / L : 40 / 25         Ankle EV R / L : 15 / 12        Gastroc R / L : 0 / 0      Strength R / L :    Hip Flexion:     4+ / 4+    Hip Extension:     4 / 4    Hip Abduction:    3+ / 4    Hip Adduction:    4 / 4    Knee Extension:   4+ / 4+    Knee Flexion:       4 / 4    Ankle DF:             5 / 5    Ankle PF:              4 / 4-    Ankle INV:      5 / 4+    Ankle EV:     5 / 4+     Neurological: Pt endorses intact/symmetrical BLE sensation.     Gait: Increased pronation B, mild  "unsteadiness with turns, decreased tamanna, no assistive device     Balance:    Feet together:    Modified Tandem: R forward / L forward    Tandem: R forward / L forward    SLS: R / L     Special Tests R / L :     Anterior Drawer:    - / -    Talar tilt lateral:      - / -     Talar tilt medial:     - / -     Squeeze Test:         - / -    Carbajal's test :  - / -     Outcome Measure:    LEFS : 52 / 80      Treatment:  Therapeutic Exercise   Bridges x 20   SLR x 10 ea   Hooklying clam 5\" x 20 (black)   PPT w/march x 10   Towel scrunches x 20 ea   Seated toe raises x 20   Seated heel raises x 20   Ankle circles x 10 ea CW/CCW    Goals:  Active       PT Problem       Patient will be independent with home exercise program for home maintenance.        Start:  08/29/24    Expected End:  09/29/24            Pt will be able to maintain tandem stance w/fingertip support for 30 seconds each way to indicate improved static balance.        Start:  08/29/24    Expected End:  09/29/24            Pt will increase BLE strength to at least 4+/5 in weakened muscle groups to facilitate improved stair negotiation, ability to get up out of chairs, and walking.        Start:  08/29/24    Expected End:  11/13/24            Pt will report no falls to indicate improved balance/safety awareness and improved static/dynamic balance.       Start:  08/29/24    Expected End:  11/13/24                             "

## 2024-09-16 ENCOUNTER — TELEPHONE (OUTPATIENT)
Dept: PRIMARY CARE | Facility: CLINIC | Age: 79
End: 2024-09-16
Payer: MEDICARE

## 2024-09-16 DIAGNOSIS — U07.1 COVID-19 VIRUS INFECTION: Primary | ICD-10-CM

## 2024-09-16 RX ORDER — NIRMATRELVIR AND RITONAVIR 300-100 MG
3 KIT ORAL 2 TIMES DAILY
Qty: 30 TABLET | Refills: 0 | Status: SHIPPED | OUTPATIENT
Start: 2024-09-16 | End: 2024-09-21

## 2024-09-17 ENCOUNTER — APPOINTMENT (OUTPATIENT)
Dept: PHYSICAL THERAPY | Facility: CLINIC | Age: 79
End: 2024-09-17
Payer: MEDICARE

## 2024-09-18 ENCOUNTER — SPECIALTY PHARMACY (OUTPATIENT)
Dept: PHARMACY | Facility: CLINIC | Age: 79
End: 2024-09-18

## 2024-09-24 ENCOUNTER — APPOINTMENT (OUTPATIENT)
Dept: PHYSICAL THERAPY | Facility: CLINIC | Age: 79
End: 2024-09-24
Payer: MEDICARE

## 2024-10-01 ENCOUNTER — TELEPHONE (OUTPATIENT)
Dept: RHEUMATOLOGY | Facility: CLINIC | Age: 79
End: 2024-10-01
Payer: MEDICARE

## 2024-10-02 DIAGNOSIS — M81.0 AGE RELATED OSTEOPOROSIS, UNSPECIFIED PATHOLOGICAL FRACTURE PRESENCE: Primary | ICD-10-CM

## 2024-10-02 RX ORDER — DIPHENHYDRAMINE HYDROCHLORIDE 50 MG/ML
50 INJECTION INTRAMUSCULAR; INTRAVENOUS AS NEEDED
OUTPATIENT
Start: 2024-10-09

## 2024-10-02 RX ORDER — EPINEPHRINE 0.3 MG/.3ML
0.3 INJECTION SUBCUTANEOUS EVERY 5 MIN PRN
OUTPATIENT
Start: 2024-10-09

## 2024-10-02 RX ORDER — ALBUTEROL SULFATE 0.83 MG/ML
3 SOLUTION RESPIRATORY (INHALATION) AS NEEDED
OUTPATIENT
Start: 2024-10-09

## 2024-10-02 RX ORDER — FAMOTIDINE 10 MG/ML
20 INJECTION INTRAVENOUS ONCE AS NEEDED
OUTPATIENT
Start: 2024-10-09

## 2024-10-02 NOTE — PROGRESS NOTES
Per Dr. Stockton:   [facility], patient cannot tolerate Forteo.  Can you please order Evenity monthly for for 1 year?  I will order a CMP, but can you please tell her to get it done within 30 days of her first injection?  She would like to go to [facility]

## 2024-10-03 ENCOUNTER — SPECIALTY PHARMACY (OUTPATIENT)
Dept: PHARMACY | Facility: CLINIC | Age: 79
End: 2024-10-03

## 2024-10-03 ENCOUNTER — CLINICAL SUPPORT (OUTPATIENT)
Dept: PRIMARY CARE | Facility: CLINIC | Age: 79
End: 2024-10-03
Payer: MEDICARE

## 2024-10-03 VITALS — HEART RATE: 70 BPM | DIASTOLIC BLOOD PRESSURE: 76 MMHG | SYSTOLIC BLOOD PRESSURE: 146 MMHG

## 2024-10-04 ENCOUNTER — DOCUMENTATION (OUTPATIENT)
Dept: INFUSION THERAPY | Facility: CLINIC | Age: 79
End: 2024-10-04
Payer: MEDICARE

## 2024-10-04 NOTE — PROGRESS NOTES
"CLINICAL CLEARANCE FOR OUTPATIENT INJECTION      Patient to be scheduled for New Start of Evenity injections.    For Diagnosis: Osteoporosis     Review of Baseline Labs…  Ionized or Corrected Calcium: PENDING UPDATED LABS  Lab Results   Component Value Date    CALCIUM 9.8 01/24/2024    PHOS 2.5 07/10/2018    No results found for: \"CAION\"   Lab Results   Component Value Date    ALBUMIN 4.2 01/24/2024      (>8.6 or ionized calcium WNL within 28 days of scheduled injection) (Hypocalcemia must be corrected prior to initiation.)    Calcium and Vitamin D on medication list: No  (if no nurse to encourage discussion of supplementation at visit)  Current Outpatient Medications   Medication Instructions    acetaminophen (TYLENOL 8 HOUR) 650 mg, oral, Every 8 hours PRN, Do not crush, chew, or split.    ALPRAZolam (XANAX) 0.5 mg, oral, Nightly PRN    cholecalciferol (Vitamin D-3) 50 mcg (2,000 unit) capsule oral    docusate sodium (COLACE) 100 mg, oral, 2 times daily    melatonin 10 mg tablet,chewable oral    mirtazapine (REMERON) 30 mg, oral, Nightly    romosozumab (EVENITY) 210 mg, subcutaneous, Every 28 days    sertraline (ZOLOFT) 100 mg, oral, Daily        Any history of MI or stroke within the last year? No  (If YES contact prescribing provider prior to proceeding. Evenity is not recommended in pts who have had an MI or stroke within the preceding year)  Patient Active Problem List   Diagnosis    Medicare annual wellness visit, subsequent    Age related osteoporosis    Anemia, B12 deficiency    Anxiety    Clostridium difficile colitis    Depression    Elevated low density lipoprotein (LDL) cholesterol level    Essential hypertension    Recurrent cold sores    Stage 3a chronic kidney disease (Multi)      Past Medical History:   Diagnosis Date    Personal history of other endocrine, nutritional and metabolic disease     History of hyperlipidemia    Vitamin B12 deficiency anemia due to intrinsic factor deficiency     " Pernicious anemia        No noted dental work in the past 4 weeks per chart review. Nurse to confirm at visit.    Urine Hcg test ordered prior to first injection? Not applicable (If female pt <60 years of age and with reproductive capability assure order in place)    Last injection received: NA (if continuation)   Due: ANYTIME    Okay to schedule treatment as ordered per prescribing provider.

## 2024-10-29 ENCOUNTER — TELEPHONE (OUTPATIENT)
Dept: PRIMARY CARE | Facility: CLINIC | Age: 79
End: 2024-10-29
Payer: MEDICARE

## 2024-10-29 DIAGNOSIS — R26.81 GAIT INSTABILITY: Primary | ICD-10-CM

## 2024-10-31 ENCOUNTER — APPOINTMENT (OUTPATIENT)
Dept: PRIMARY CARE | Facility: CLINIC | Age: 79
End: 2024-10-31
Payer: MEDICARE

## 2024-11-07 ENCOUNTER — TELEPHONE (OUTPATIENT)
Dept: PRIMARY CARE | Facility: CLINIC | Age: 79
End: 2024-11-07

## 2024-11-07 ENCOUNTER — CLINICAL SUPPORT (OUTPATIENT)
Dept: PRIMARY CARE | Facility: CLINIC | Age: 79
End: 2024-11-07
Payer: MEDICARE

## 2024-11-07 DIAGNOSIS — D51.8 OTHER VITAMIN B12 DEFICIENCY ANEMIA: ICD-10-CM

## 2024-11-07 DIAGNOSIS — M79.605 PAIN IN BOTH LOWER EXTREMITIES: ICD-10-CM

## 2024-11-07 DIAGNOSIS — R26.81 GAIT INSTABILITY: Primary | ICD-10-CM

## 2024-11-07 DIAGNOSIS — M79.604 PAIN IN BOTH LOWER EXTREMITIES: ICD-10-CM

## 2024-11-07 PROCEDURE — 96372 THER/PROPH/DIAG INJ SC/IM: CPT | Performed by: FAMILY MEDICINE

## 2024-11-07 RX ORDER — CYANOCOBALAMIN 1000 UG/ML
1000 INJECTION, SOLUTION INTRAMUSCULAR; SUBCUTANEOUS ONCE
Status: COMPLETED | OUTPATIENT
Start: 2024-11-07 | End: 2024-11-07

## 2024-12-19 ENCOUNTER — CLINICAL SUPPORT (OUTPATIENT)
Dept: PRIMARY CARE | Facility: CLINIC | Age: 79
End: 2024-12-19
Payer: MEDICARE

## 2024-12-19 PROCEDURE — 96372 THER/PROPH/DIAG INJ SC/IM: CPT | Performed by: FAMILY MEDICINE

## 2024-12-31 ENCOUNTER — DOCUMENTATION (OUTPATIENT)
Dept: INFUSION THERAPY | Facility: CLINIC | Age: 79
End: 2024-12-31
Payer: MEDICARE

## 2025-02-03 ENCOUNTER — APPOINTMENT (OUTPATIENT)
Dept: PRIMARY CARE | Facility: CLINIC | Age: 80
End: 2025-02-03
Payer: MEDICARE

## 2025-02-03 DIAGNOSIS — D51.8 OTHER VITAMIN B12 DEFICIENCY ANEMIA: ICD-10-CM

## 2025-02-03 RX ORDER — CYANOCOBALAMIN 1000 UG/ML
1000 INJECTION, SOLUTION INTRAMUSCULAR; SUBCUTANEOUS ONCE
Status: SHIPPED | OUTPATIENT
Start: 2025-02-03

## 2025-04-28 ENCOUNTER — APPOINTMENT (OUTPATIENT)
Dept: PRIMARY CARE | Facility: CLINIC | Age: 80
End: 2025-04-28
Payer: MEDICARE

## 2025-04-28 ENCOUNTER — TELEPHONE (OUTPATIENT)
Dept: PRIMARY CARE | Facility: CLINIC | Age: 80
End: 2025-04-28

## 2025-04-28 DIAGNOSIS — R94.6 ABNORMAL THYROID EXAM: ICD-10-CM

## 2025-04-28 DIAGNOSIS — N18.31 STAGE 3A CHRONIC KIDNEY DISEASE (MULTI): ICD-10-CM

## 2025-04-28 DIAGNOSIS — R73.9 ELEVATED BLOOD SUGAR: ICD-10-CM

## 2025-04-28 DIAGNOSIS — E78.00 ELEVATED LOW DENSITY LIPOPROTEIN (LDL) CHOLESTEROL LEVEL: ICD-10-CM

## 2025-04-28 DIAGNOSIS — E55.9 VITAMIN D DEFICIENCY: ICD-10-CM

## 2025-04-28 DIAGNOSIS — D51.8 OTHER VITAMIN B12 DEFICIENCY ANEMIA: Primary | ICD-10-CM

## 2025-06-10 ENCOUNTER — APPOINTMENT (OUTPATIENT)
Dept: PRIMARY CARE | Facility: CLINIC | Age: 80
End: 2025-06-10
Payer: MEDICARE

## 2025-06-17 ENCOUNTER — APPOINTMENT (OUTPATIENT)
Dept: PRIMARY CARE | Facility: CLINIC | Age: 80
End: 2025-06-17
Payer: MEDICARE

## 2025-06-18 ENCOUNTER — OFFICE VISIT (OUTPATIENT)
Dept: PRIMARY CARE | Facility: CLINIC | Age: 80
End: 2025-06-18
Payer: MEDICARE

## 2025-06-18 ENCOUNTER — HOSPITAL ENCOUNTER (OUTPATIENT)
Dept: RADIOLOGY | Facility: CLINIC | Age: 80
Discharge: HOME | End: 2025-06-18
Payer: MEDICARE

## 2025-06-18 VITALS
WEIGHT: 132 LBS | SYSTOLIC BLOOD PRESSURE: 160 MMHG | BODY MASS INDEX: 24.92 KG/M2 | HEIGHT: 61 IN | DIASTOLIC BLOOD PRESSURE: 84 MMHG | HEART RATE: 78 BPM | OXYGEN SATURATION: 95 %

## 2025-06-18 DIAGNOSIS — F33.41 RECURRENT MAJOR DEPRESSIVE DISORDER, IN PARTIAL REMISSION: Primary | ICD-10-CM

## 2025-06-18 DIAGNOSIS — S97.82XA CRUSH INJURY OF LEFT FOOT, INITIAL ENCOUNTER: ICD-10-CM

## 2025-06-18 DIAGNOSIS — N18.31 STAGE 3A CHRONIC KIDNEY DISEASE (MULTI): ICD-10-CM

## 2025-06-18 PROBLEM — R41.89 COGNITIVE IMPAIRMENT: Status: ACTIVE | Noted: 2025-06-18

## 2025-06-18 PROCEDURE — 3077F SYST BP >= 140 MM HG: CPT | Performed by: FAMILY MEDICINE

## 2025-06-18 PROCEDURE — 1036F TOBACCO NON-USER: CPT | Performed by: FAMILY MEDICINE

## 2025-06-18 PROCEDURE — 73630 X-RAY EXAM OF FOOT: CPT | Mod: LEFT SIDE | Performed by: RADIOLOGY

## 2025-06-18 PROCEDURE — 99214 OFFICE O/P EST MOD 30 MIN: CPT | Performed by: FAMILY MEDICINE

## 2025-06-18 PROCEDURE — 1157F ADVNC CARE PLAN IN RCRD: CPT | Performed by: FAMILY MEDICINE

## 2025-06-18 PROCEDURE — 73630 X-RAY EXAM OF FOOT: CPT | Mod: LT

## 2025-06-18 PROCEDURE — 3079F DIAST BP 80-89 MM HG: CPT | Performed by: FAMILY MEDICINE

## 2025-06-18 PROCEDURE — 1123F ACP DISCUSS/DSCN MKR DOCD: CPT | Performed by: FAMILY MEDICINE

## 2025-06-18 PROCEDURE — G2211 COMPLEX E/M VISIT ADD ON: HCPCS | Performed by: FAMILY MEDICINE

## 2025-06-18 RX ORDER — OLANZAPINE 2.5 MG/1
TABLET, FILM COATED ORAL
COMMUNITY
Start: 2025-05-22

## 2025-06-18 RX ORDER — LURASIDONE HYDROCHLORIDE 20 MG/1
TABLET, FILM COATED ORAL
COMMUNITY
Start: 2025-05-16 | End: 2025-06-18 | Stop reason: ALTCHOICE

## 2025-06-18 NOTE — PROGRESS NOTES
"Zohra Jones is an 79 year old woman with pertinent history of osteoporosis and left ankle fracture in 2014 and who presents today with left foot and ankle pain of one month duration.    Subjective  Ms. Jones presents today with left foot pain, which first began a month ago when she dropped an ironing board on her foot. She states that the pain and swelling have improved slightly over the past month with ice and tylenol, but she says that it is still painful to walk. She uses a cane to ambulate.     PHQ-9 score was 8 (mild depression). Ms. Jones reports once daily xanax use.    MMSE score was 27. She states that she has been able to complete ADLs, although she hasn't been leaving her apartment as much since her ankle injury. One of her daughters lives 10 mins away and she sees her frequently.    Objective  /84   Pulse 78   Ht 1.549 m (5' 1\")   Wt 59.9 kg (132 lb)   SpO2 95%   BMI 24.94 kg/m²     Physical exam  General: Pt is seated comfortably, in no acute distress.  Cardio: Regular rate and rhythm, no murmurs/ rubs/ gallops.  Pulm: Equal air entry bilaterally, no wheezes or crackles.  MSK: Left foot and ankle are swollen. Ecchymosis on medial aspect of left foot.     Assessment  Ms. Jones is an 79 year old woman who presents today with left foot and ankle pain of one month duration. Foot/ ankle pain are concerning for fracture, in the setting of osteoporosis and month long duration of pain and swelling. X ray ordered for further evaluation.    Plan  # Left foot/ ankle pain  - XR left foot  - If signs of fracture on XR, follow up with orthopedics    # Osteoporosis  - Pt hesitant to get monthly romosuzumab injections, other less frequent injection options discussed with pt    # Depression   # Anxiety  :: PHQ-9 score = 8 (mild depression)  - Follow up with psychiatry  - Continue sertraline 100 mg  - Continue xanax 0.5 mg prn    "

## 2025-06-18 NOTE — PROGRESS NOTES
"Subjective   Patient ID: Zohra Hines is a 79 y.o. female who presents for Foot Injury (Left).    HPI     The patient reports that she is taking mirtazapine for her insomnia and Sertraline for her anxiety and depression and well as Xanax as needed. Additionally she is on melatonin, Zyprexa.  She has not started taking Evenity injections for osteoporosis. Follows with Dr. Stockton.    Today she presents with concerns over left ankle injury which she sustained about a month ago.  She has swelling and pain; however, she is able to move it.    She is mildly depressed.  She states her younger daughter lives nearby and sees her once a week. MMSE was normal at 27. She mentions she has enrolled in a course for cognitive ability,      Review of Systems  Constitutional: No fever or chills  Cardiovascular: no chest pain, no palpitations and no syncope.   Respiratory: no cough, no shortness of breath during exertion and no shortness of breath at rest.   Gastrointestinal: no abdominal pain, no nausea and no vomiting.  Neuro: No Headache, no dizziness  MSK: +left foot injury    Objective   /84   Pulse 78   Ht 1.549 m (5' 1\")   Wt 59.9 kg (132 lb)   SpO2 95%   BMI 24.94 kg/m²     Physical Exam  Constitutional: Alert and in no acute distress. Well developed, well nourished  Head and Face: Head and face: Normal.    Cardiovascular: Heart rate and rhythm were normal, normal S1 and S2. No peripheral edema.   Pulmonary: No respiratory distress. Clear bilateral breath sounds.  Musculoskeletal: Gait and station: Normal. Muscle strength/tone: Normal.   Skin: Normal skin color and pigmentation, normal skin turgor, and no rash.    Psychiatric: Judgment and insight: Intact. Mood and affect: Normal.      Lab Results   Component Value Date    WBC 4.4 01/24/2024    HGB 10.6 (L) 01/24/2024    HCT 31.7 (L) 01/24/2024     01/24/2024    CHOL 151 01/24/2024    TRIG 75 01/24/2024    HDL 58.4 01/24/2024    ALT 9 01/24/2024    AST " 11 01/24/2024     01/24/2024    K 4.8 01/24/2024     01/24/2024    CREATININE 1.47 (H) 01/24/2024    BUN 40 (H) 01/24/2024    CO2 28 01/24/2024    TSH 3.04 01/24/2024    INR 1.0 07/08/2018    HGBA1C 3.7 01/24/2024       XR DEXA bone density  Narrative: Interpreted By:  Yesica Finley,   STUDY:  DEXA BONE DENSITY4/12/2024 12:59 pm      INDICATION:  Signs/Symptoms:See Associated Diagnosis. The patient is a 79 y/o  year old F.      COMPARISON:  10/13/2016      ACCESSION NUMBER(S):  [id number]      ORDERING CLINICIAN:  NATO SIFUENTES      TECHNIQUE:  DEXA BONE DENSITY      FINDINGS:  SPINE L1-L4  Bone Mineral Density: 1.07  T-Score 0.2  Z-Score 2.8  Bone Mineral Density change vs baseline:  17.9  Bone Mineral Density change vs previous: -1.6      LEFT FEMUR -TOTAL  Bone Mineral Density: 0.726  T-Score -1.8   Z-Score  0.2  Bone Mineral Density change vs baseline: -13.9  Bone Mineral Density change vs previous: -5.4      LEFT FEMUR -NECK  Bone Mineral Density: 0.52  T-Score -3  Z-Score -0.7          World Health Organization (WHO) criteria for post-menopausal,   Women:  Normal:         T-score at or above -1 SD  Osteopenia:   T-score between -1 and -2.5 SD  Osteoporosis: T-score at or below -2.5 SD          10-year Fracture Risk:  Major Osteoporotic Fracture  Not reported  Hip Fracture                        Not reported      Note:  If no FRAX score is reported, it is because:  Some T-score for Spine Total or Hip Total or Femoral Neck at or  below-2.5/prior hip or vertebral fracture.      This exam was performed at [facility] on a [facility]  [facility] Dexa Unit.          Impression: DEXA:  According to World Health Organization criteria,  classification is osteoporosis.      Followup recommended in two years or sooner as clinically warranted.      All images and detailed analysis are available on the  Radiology  PACS.      MACRO:  None      Signed by: Yesica Finley 4/12/2024 4:07  PM  Dictation workstation:   [id number]      Assessment/Plan   Assessment & Plan  Crush injury of left foot, initial encounter  X-ray ordered.  Orders:    XR foot left 3+ views; Future    Recurrent major depressive disorder, in partial remission  She will follow up with Psych.   Continue sertraline and use Xanax as needed.        Stage 3a chronic kidney disease (Multi)  Stable.            Your yearly Physical is due in: Sep 2025  When you call the office for your yearly Physical, please ask them to inform me to order your blood work, so that you can get the fasting blood work before your appointment and we can discuss the results at your physical.      Please call me if any questions arise from now until your next visit. I will call you after I am done seeing patients. A Doctor is always available by phone when the office is closed. Please feel free to call for help with any problem that you feel shouldn't wait until the office re-opens.     ITiarra MD, attest that this note for 6/18/2025 accurately reflects documentation that my scribe Madelin Barroso, made at my direction in my capacity as Tiarra Frye MD when I treated jessiac Hines.    Scribe Attestation  By signing my name below, Madelin BRUNO Scribe   attest that this documentation has been prepared under the direction and in the presence of Tiarra Frye MD.

## 2025-06-19 LAB
25(OH)D3+25(OH)D2 SERPL-MCNC: 20 NG/ML (ref 30–100)
ALBUMIN SERPL-MCNC: 5.1 G/DL (ref 3.6–5.1)
ALP SERPL-CCNC: 79 U/L (ref 37–153)
ALT SERPL-CCNC: 13 U/L (ref 6–29)
ANION GAP SERPL CALCULATED.4IONS-SCNC: 14 MMOL/L (CALC) (ref 7–17)
AST SERPL-CCNC: 18 U/L (ref 10–35)
BILIRUB SERPL-MCNC: 0.7 MG/DL (ref 0.2–1.2)
BUN SERPL-MCNC: 41 MG/DL (ref 7–25)
CALCIUM SERPL-MCNC: 9.9 MG/DL (ref 8.6–10.4)
CHLORIDE SERPL-SCNC: 101 MMOL/L (ref 98–110)
CHOLEST SERPL-MCNC: 284 MG/DL
CHOLEST/HDLC SERPL: 4.5 (CALC)
CO2 SERPL-SCNC: 24 MMOL/L (ref 20–32)
CREAT SERPL-MCNC: 1.85 MG/DL (ref 0.6–0.95)
EGFRCR SERPLBLD CKD-EPI 2021: 27 ML/MIN/1.73M2
ERYTHROCYTE [DISTWIDTH] IN BLOOD BY AUTOMATED COUNT: 13.4 % (ref 11–15)
EST. AVERAGE GLUCOSE BLD GHB EST-MCNC: 105 MG/DL
EST. AVERAGE GLUCOSE BLD GHB EST-SCNC: 5.8 MMOL/L
GLUCOSE SERPL-MCNC: 90 MG/DL (ref 65–99)
HBA1C MFR BLD: 5.3 %
HCT VFR BLD AUTO: 44.7 % (ref 35–45)
HDLC SERPL-MCNC: 63 MG/DL
HGB BLD-MCNC: 15 G/DL (ref 11.7–15.5)
LDLC SERPL CALC-MCNC: 183 MG/DL (CALC)
MCH RBC QN AUTO: 36.9 PG (ref 27–33)
MCHC RBC AUTO-ENTMCNC: 33.6 G/DL (ref 32–36)
MCV RBC AUTO: 109.8 FL (ref 80–100)
NONHDLC SERPL-MCNC: 221 MG/DL (CALC)
PLATELET # BLD AUTO: 188 THOUSAND/UL (ref 140–400)
PMV BLD REES-ECKER: 10.5 FL (ref 7.5–12.5)
POTASSIUM SERPL-SCNC: 5 MMOL/L (ref 3.5–5.3)
PROT SERPL-MCNC: 7.5 G/DL (ref 6.1–8.1)
RBC # BLD AUTO: 4.07 MILLION/UL (ref 3.8–5.1)
SODIUM SERPL-SCNC: 139 MMOL/L (ref 135–146)
TRIGL SERPL-MCNC: 202 MG/DL
TSH SERPL-ACNC: 2.5 MIU/L (ref 0.4–4.5)
VIT B12 SERPL-MCNC: >2000 PG/ML (ref 200–1100)
WBC # BLD AUTO: 5.4 THOUSAND/UL (ref 3.8–10.8)

## 2025-06-25 ENCOUNTER — TELEPHONE (OUTPATIENT)
Dept: PRIMARY CARE | Facility: CLINIC | Age: 80
End: 2025-06-25
Payer: MEDICARE

## 2025-07-15 ENCOUNTER — APPOINTMENT (OUTPATIENT)
Dept: PRIMARY CARE | Facility: CLINIC | Age: 80
End: 2025-07-15
Payer: MEDICARE

## 2025-07-22 ENCOUNTER — TELEPHONE (OUTPATIENT)
Dept: PRIMARY CARE | Facility: CLINIC | Age: 80
End: 2025-07-22
Payer: MEDICARE

## 2025-07-28 ENCOUNTER — TELEPHONE (OUTPATIENT)
Dept: RHEUMATOLOGY | Facility: CLINIC | Age: 80
End: 2025-07-28

## 2025-07-28 ENCOUNTER — APPOINTMENT (OUTPATIENT)
Dept: RHEUMATOLOGY | Facility: CLINIC | Age: 80
End: 2025-07-28
Payer: MEDICARE

## 2025-07-28 VITALS
BODY MASS INDEX: 23.3 KG/M2 | HEART RATE: 76 BPM | WEIGHT: 123.4 LBS | HEIGHT: 61 IN | DIASTOLIC BLOOD PRESSURE: 97 MMHG | SYSTOLIC BLOOD PRESSURE: 193 MMHG | OXYGEN SATURATION: 93 %

## 2025-07-28 DIAGNOSIS — N18.31 STAGE 3A CHRONIC KIDNEY DISEASE (MULTI): ICD-10-CM

## 2025-07-28 DIAGNOSIS — M15.9 GENERALIZED OSTEOARTHRITIS OF MULTIPLE SITES: ICD-10-CM

## 2025-07-28 DIAGNOSIS — M16.0 PRIMARY OSTEOARTHRITIS OF BOTH HIPS: ICD-10-CM

## 2025-07-28 DIAGNOSIS — M80.00XA AGE-RELATED OSTEOPOROSIS WITH CURRENT PATHOLOGICAL FRACTURE, INITIAL ENCOUNTER: Primary | ICD-10-CM

## 2025-07-28 DIAGNOSIS — M17.0 PRIMARY OSTEOARTHRITIS OF BOTH KNEES: ICD-10-CM

## 2025-07-28 DIAGNOSIS — M47.9 OSTEOARTHRITIS OF SPINE, UNSPECIFIED SPINAL OSTEOARTHRITIS COMPLICATION STATUS, UNSPECIFIED SPINAL REGION: ICD-10-CM

## 2025-07-28 DIAGNOSIS — R79.89 ELEVATED SERUM CREATININE: ICD-10-CM

## 2025-07-28 DIAGNOSIS — M80.0B9A: ICD-10-CM

## 2025-07-28 PROCEDURE — 1159F MED LIST DOCD IN RCRD: CPT | Performed by: STUDENT IN AN ORGANIZED HEALTH CARE EDUCATION/TRAINING PROGRAM

## 2025-07-28 PROCEDURE — 3080F DIAST BP >= 90 MM HG: CPT | Performed by: STUDENT IN AN ORGANIZED HEALTH CARE EDUCATION/TRAINING PROGRAM

## 2025-07-28 PROCEDURE — 99215 OFFICE O/P EST HI 40 MIN: CPT | Performed by: STUDENT IN AN ORGANIZED HEALTH CARE EDUCATION/TRAINING PROGRAM

## 2025-07-28 PROCEDURE — 3077F SYST BP >= 140 MM HG: CPT | Performed by: STUDENT IN AN ORGANIZED HEALTH CARE EDUCATION/TRAINING PROGRAM

## 2025-07-28 NOTE — PATIENT INSTRUCTIONS
-With current kidney function, I would recommend just starting Prolia lifelong    -Please get a repeat bone density 4/12/2026 or later at [facility]    -Please make an appointment with me for one year    -Please follow up with Dr Frye for your High blood pressure    -Continue your vitamin D3 4000 units is fine, and then add an over the counter calcium supplement; 500-600 mg daily is fine.    -I think your gait instability is likely due to osteoarthritis of your hips, knees, and lower back; I would like you to see physical therapy; I will place an order    -You could discuss switching your zoloft to cymbalta (duloxetine) with Dr Carnes     -Tylenol up to 3 grams a day can be used    -Avoid nsaids such as naproxen    -Either I or likely Dr Frye can prescribe tramadol if you would like down the line    -Make an appointment with me for 1 year

## 2025-07-28 NOTE — PROGRESS NOTES
"Subjective   Patient ID: Zohra Hines is a 79 y.o. female who presents for Follow-up (Osteoarthritis ).  HPI:    Stiffness in L ankle, imbalance,   Personal or family history of fragility fracture?   Fractured her L ankle after a slip around 2014, in 2018, fractured right hip, had to get a repair                                                            Hx of hypothyroidism/hyperthyroidism/hyperparathyroidism/hypoparathyroidism? no  Hx of GI , renal , liver, collagen or malignancy issues?          CKD EGFR 36  Accelerated bone loss meds ?    anti-seizure/ depression meds, immunosuppressants , anti- aromatase inhibitors, Thyroid replacement therapy, anticoagulants?   steroids?     no                     Anti-resorptive meds? Bisphosphonates, estrogen, Calcitonin- was on Prolia in the past ; it was stopped after her past doctor, Dr Marcus retired, Was on Prolia from 2993-1347 (approximately); it was stopped as patient had to go to infusion center ; distant history of fosamax for 10 years; trialed forteo for 1 month in 2024; could not handle daily injections; evenity was then recommended but patient did not start, and then CKD progressed to a point that was incompatible with evenity  dental hygiene? No issues  dental work planned? Had repair of an implant 4/2024  Dairy (Y)  Ca/VitD (Y) ETOH (Y-one glass of wine daily) Tobacco (history of distant smoking)    Menopause? Age 53 approximately  Heart attack or stroke? No  Kidney stones; No    Secondary labs:   2024: Cr H at 1.47, EGFR 36, TSH normal, Vit D normal  Objective   BP (!) 193/97 (BP Location: Left arm, Patient Position: Sitting, BP Cuff Size: Adult)   Pulse 76   Ht (!) 1.549 m (5' 1\")   Wt 56 kg (123 lb 6.4 oz)   SpO2 93%   BMI 23.32 kg/m²       Physical Exam  Constitutional: Alert and in no acute distress. Well developed, well nourished    Lab Results   Component Value Date    WBC 5.4 06/18/2025    HGB 15.0 06/18/2025    HCT 44.7 06/18/2025    PLT " "188 06/18/2025    ALT 13 06/18/2025    AST 18 06/18/2025    CREATININE 1.85 (H) 06/18/2025          No results found for: \"ANANP\", \"ANATITERADD\", \"ANACO\", \"ANAPATTRN\", \"ANPA2\", \"FANAP\", \"ANATITER\", \"ANAT2\", \"BRANDEN\", \"CDCANA\", \"DSDN\", \"EMPSMRNP\", \"SCLN\", \"SCLABQ\", \"SCIB\", \"CTIB\", \"AOULCS48\", \"HXRUCO86\", \"ASSB\", \"SSBB\", \"C3\", \"C4\", \"UAMICCOMM\", \"UTPCR\", \"ANTIRIBO\", \"ACEN\", \"SEDRATE\", \"NONUHFIRE\", \"POCESR\", \"CRP\", \"RF\", \"CCPIGGQUAL\"\\            There is currently no information documented on the homunculus. Go to the Rheumatology activity and complete the homunculus joint exam.    Signed by: Yesica Finley 4/12/2024 4:07 PM  Dictation workstation:   [id number]    Assessment/Plan:  #Osteoporosis  -Patient counseled on osteoporosis diagnosis. Patient counseled on necessary vitamin D and calcium supplementation.  Patient counseled on importance of stability and balance.  Weightbearing exercises, such as walking, encouraged.  Patient handout given.  -TSH normal, Vit D normal; will get rest of secondary labs as well as MBD labs given elevated Creatinine; ordered 7/2025    -4/2024 DEXA at [facility]- personally reviewed and interpreted by myself 5/2024- should repeat 4/12/2026; ordered 7/2025 and patient counseled to schedule  FINDINGS:  SPINE L1-L4  Bone Mineral Density: 1.07  T-Score 0.2  Z-Score 2.8  Bone Mineral Density change vs baseline:  17.9  Bone Mineral Density change vs previous: -1.6      LEFT FEMUR -TOTAL  Bone Mineral Density: 0.726  T-Score -1.8   Z-Score  0.2  Bone Mineral Density change vs baseline: -13.9  Bone Mineral Density change vs previous: -5.4      LEFT FEMUR -NECK  Bone Mineral Density: 0.52  T-Score -3  Z-Score -0.7    -given history of R hip fracture and T score of -3 at left femur, but given progressing CKD, will restart Prolia; at last visit , had recommended evenity , but patients EGFR is no longer supportive of evenity  -1 year prolia ordered 7/2025 with CMP x 2   - Risks of denosumab " discussed including but not limited to back pain, hand pain, feet  pain, , low calcium, jaw osteonecrosis, atypical femur fracture, allergies, risk of rebound bone loss if patient is not completely adherent with injection schedule. Patient understanding and aware of risks    #Uncontrolled HTN  -discussed with PMD today, PMD discussed with patient today    #Gait instability  #OA  -likely due to OA ; recommend PT-referred 7/2025  -For OA pain, can use tylenol up to 3 g a day. She can discuss switching her zoloft to cymbalta with her psychiatrist. Tramadol could be considered  -Avoid nsaids    Patient counseled to seek medical care if any new or worsening symptoms, urgently if needed.      Note discussed with primary care doctor.    Return to clinic in 1 year,  sooner if needed    Dragon dictation software was used to dictate this note. Errors may have occurred during dictation that was not intended by the user.

## 2025-07-29 DIAGNOSIS — M81.0 AGE RELATED OSTEOPOROSIS, UNSPECIFIED PATHOLOGICAL FRACTURE PRESENCE: Primary | ICD-10-CM

## 2025-07-29 RX ORDER — FAMOTIDINE 10 MG/ML
20 INJECTION, SOLUTION INTRAVENOUS ONCE AS NEEDED
OUTPATIENT
Start: 2025-07-30

## 2025-07-29 RX ORDER — EPINEPHRINE 0.3 MG/.3ML
0.3 INJECTION SUBCUTANEOUS EVERY 5 MIN PRN
OUTPATIENT
Start: 2025-07-30

## 2025-07-29 RX ORDER — DIPHENHYDRAMINE HYDROCHLORIDE 50 MG/ML
50 INJECTION, SOLUTION INTRAMUSCULAR; INTRAVENOUS AS NEEDED
OUTPATIENT
Start: 2025-07-30

## 2025-07-29 RX ORDER — ALBUTEROL SULFATE 0.83 MG/ML
3 SOLUTION RESPIRATORY (INHALATION) AS NEEDED
OUTPATIENT
Start: 2025-07-30

## 2025-07-29 NOTE — PROGRESS NOTES
Per Dr. Stockton:   Can you order prolia for one year (2 injections)? You can cancel the Evenity, she never scheduled. And now, her kidney function is not amenable to Evenity. She would like to go to [facility].

## 2025-07-30 ENCOUNTER — TELEPHONE (OUTPATIENT)
Dept: RHEUMATOLOGY | Facility: CLINIC | Age: 80
End: 2025-07-30
Payer: MEDICARE

## 2025-07-30 DIAGNOSIS — I10 ESSENTIAL HYPERTENSION: Primary | ICD-10-CM

## 2025-07-30 DIAGNOSIS — M81.0 AGE RELATED OSTEOPOROSIS, UNSPECIFIED PATHOLOGICAL FRACTURE PRESENCE: ICD-10-CM

## 2025-07-30 RX ORDER — AMLODIPINE BESYLATE 2.5 MG/1
2.5 TABLET ORAL DAILY
Qty: 30 TABLET | Refills: 0 | Status: SHIPPED | OUTPATIENT
Start: 2025-07-30

## 2025-07-31 ENCOUNTER — DOCUMENTATION (OUTPATIENT)
Dept: INFUSION THERAPY | Facility: CLINIC | Age: 80
End: 2025-07-31
Payer: MEDICARE

## 2025-07-31 NOTE — PROGRESS NOTES
CLINICAL CLEARANCE FOR OUTPATIENT INJECTION      Patient to be scheduled for New Start of DENOSUMAB injections.    For Diagnosis: Osteoporosis    Labs required prior to treatment (ACTIVE ORDER IN CHART):    Calcium drawn on:     Lab Results   Component Value Date    CALCIUM 9.9 06/18/2025    PHOS 2.5 07/10/2018      Lab Results   Component Value Date    ALBUMIN 5.1 06/18/2025       Calcium >8.6? YES AT THAT TIME WILL NEED UPDATED LABS PRIOR TO FIRST INJECTION     (Serum or Corrected Calcium must be >8.6mg/dl. OR Ionized Calcium must be >1.1 mmol/L or >4.7 mg/dL (depending on resulting agency).  If below WNL - Contact prescribing provider. Labs should be drawn within 28 days of first treatment.)    Lab Results   Component Value Date    EGFR 27 (L) 06/18/2025      (Patients with a eGFR <30 are at increased risk of hypocalcemia) PRESCRIBING PROVIDER AWARE. WAS ORDERED EVENITY BUT AS KIDNEY FUNCTION NOT AMENDABLE TO EVENITY, PROLIA WAS ORDERED INSTEAD,       Calcium and Vitamin D supplement on medication list? VITAMIN D = YES. CALCIUM = NO.   (if no nurse to encourage discussion of supplementation at visit)  Current Medications[1]     No obvious recent dental work per chart review. Nurse to confirm no dental within past/next 4 weeks at encounter.    Urine Hcg test ordered prior to first injection?Not applicable     Last injection received: NA (if continuation)    Due: ANYTIME - PENDING LABS.     Ok to schedule for prolia; please instruct pt to have labs drawn no more than 28 days prior to their scheduled appointment - CMP    PA COMPLETE.     OH Miranda-CNP    [facility])  [address] [place] [address] A, [place] [address]  Phone: [phone]        [1]   Current Outpatient Medications:     acetaminophen (Tylenol 8 HOUR) 650 mg ER tablet, Take 1 tablet (650 mg) by mouth every 8 hours if needed for mild pain (1 - 3). Do not crush, chew, or split.,  Disp: , Rfl:     ALPRAZolam (Xanax) 0.5 mg tablet, Take 1 tablet (0.5 mg) by mouth as needed at bedtime for sleep or anxiety., Disp: , Rfl:     amLODIPine (Norvasc) 2.5 mg tablet, Take 1 tablet (2.5 mg) by mouth once daily., Disp: 30 tablet, Rfl: 0    cholecalciferol (Vitamin D-3) 50 mcg (2,000 unit) capsule, Take by mouth., Disp: , Rfl:     denosumab (Prolia) 60 mg/mL syringe, Inject 1 mL (60 mg total) under the skin every 6 months., Disp: 1 mL, Rfl: 1    docusate sodium (Colace) 50 mg capsule, Take 2 capsules (100 mg) by mouth 2 times a day., Disp: , Rfl:     melatonin 10 mg tablet,chewable, Chew., Disp: , Rfl:     OLANZapine (ZyPREXA) 2.5 mg tablet, TAKE 1 TABLET BY MOUTH EVERY NIGHT AT BEDTIME AFTER STOPPING MIRTAZAPINE & LURASIDONE, Disp: , Rfl:     sertraline (Zoloft) 100 mg tablet, Take 1 tablet (100 mg) by mouth once daily., Disp: , Rfl:     Current Facility-Administered Medications:     cyanocobalamin (Vitamin B-12) injection 1,000 mcg, 1,000 mcg, intramuscular, Once, Tiarra Fyre MD

## 2025-08-18 DIAGNOSIS — I10 ESSENTIAL HYPERTENSION: ICD-10-CM

## 2025-08-19 RX ORDER — AMLODIPINE BESYLATE 2.5 MG/1
2.5 TABLET ORAL DAILY
Qty: 30 TABLET | Refills: 0 | Status: SHIPPED | OUTPATIENT
Start: 2025-08-19

## 2025-08-26 ENCOUNTER — TELEPHONE (OUTPATIENT)
Dept: PRIMARY CARE | Facility: CLINIC | Age: 80
End: 2025-08-26
Payer: MEDICARE

## 2025-08-26 DIAGNOSIS — R26.81 GAIT INSTABILITY: Primary | ICD-10-CM

## 2025-08-26 DIAGNOSIS — G89.29 CHRONIC PAIN OF LEFT ANKLE: ICD-10-CM

## 2025-08-26 DIAGNOSIS — M25.572 CHRONIC PAIN OF LEFT ANKLE: ICD-10-CM

## 2025-09-09 ENCOUNTER — APPOINTMENT (OUTPATIENT)
Dept: PRIMARY CARE | Facility: CLINIC | Age: 80
End: 2025-09-09
Payer: MEDICARE

## 2025-10-06 ENCOUNTER — APPOINTMENT (OUTPATIENT)
Dept: INFUSION THERAPY | Facility: CLINIC | Age: 80
End: 2025-10-06
Payer: MEDICARE